# Patient Record
Sex: MALE | Race: OTHER | Employment: OTHER | URBAN - METROPOLITAN AREA
[De-identification: names, ages, dates, MRNs, and addresses within clinical notes are randomized per-mention and may not be internally consistent; named-entity substitution may affect disease eponyms.]

---

## 2019-03-03 ENCOUNTER — APPOINTMENT (OUTPATIENT)
Dept: GENERAL RADIOLOGY | Facility: HOSPITAL | Age: 79
DRG: 280 | End: 2019-03-03
Payer: MEDICARE

## 2019-03-03 ENCOUNTER — HOSPITAL ENCOUNTER (INPATIENT)
Facility: HOSPITAL | Age: 79
LOS: 6 days | Discharge: HOME | DRG: 280 | End: 2019-03-09
Admitting: HOSPITALIST
Payer: MEDICARE

## 2019-03-03 DIAGNOSIS — I21.4 NON-STEMI (NON-ST ELEVATED MYOCARDIAL INFARCTION) (HCC): Primary | ICD-10-CM

## 2019-03-03 DIAGNOSIS — I50.9 ACUTE ON CHRONIC CONGESTIVE HEART FAILURE, UNSPECIFIED HEART FAILURE TYPE (HCC): ICD-10-CM

## 2019-03-03 PROBLEM — R73.9 HYPERGLYCEMIA: Status: ACTIVE | Noted: 2019-03-03

## 2019-03-03 PROBLEM — R79.89 AZOTEMIA: Status: ACTIVE | Noted: 2019-03-03

## 2019-03-03 LAB
ADENOVIRUS PCR:: NEGATIVE
ANION GAP SERPL CALC-SCNC: 9 MMOL/L (ref 0–18)
APTT PPP: 26.9 SECONDS (ref 23.2–35.3)
APTT PPP: 64.8 SECONDS (ref 23.2–35.3)
B PERT DNA SPEC QL NAA+PROBE: NEGATIVE
BACTERIA UR QL AUTO: NEGATIVE /HPF
BASOPHILS # BLD AUTO: 0.04 X10(3) UL (ref 0–0.2)
BASOPHILS NFR BLD AUTO: 0.5 %
BILIRUB UR QL: NEGATIVE
BUN BLD-MCNC: 41 MG/DL (ref 7–18)
BUN/CREAT SERPL: 21.2 (ref 10–20)
C PNEUM DNA SPEC QL NAA+PROBE: NEGATIVE
CALCIUM BLD-MCNC: 9.1 MG/DL (ref 8.5–10.1)
CHLORIDE SERPL-SCNC: 109 MMOL/L (ref 98–107)
CHOLEST SMN-MCNC: 117 MG/DL (ref ?–200)
CLARITY UR: CLEAR
CO2 SERPL-SCNC: 22 MMOL/L (ref 21–32)
COLOR UR: YELLOW
CORONAVIRUS 229E PCR:: NEGATIVE
CORONAVIRUS HKU1 PCR:: NEGATIVE
CORONAVIRUS NL63 PCR:: NEGATIVE
CORONAVIRUS OC43 PCR:: NEGATIVE
CREAT BLD-MCNC: 1.93 MG/DL (ref 0.7–1.3)
DEPRECATED RDW RBC AUTO: 46.8 FL (ref 35.1–46.3)
EOSINOPHIL # BLD AUTO: 0.23 X10(3) UL (ref 0–0.7)
EOSINOPHIL NFR BLD AUTO: 2.7 %
ERYTHROCYTE [DISTWIDTH] IN BLOOD BY AUTOMATED COUNT: 13.8 % (ref 11–15)
EST. AVERAGE GLUCOSE BLD GHB EST-MCNC: 226 MG/DL (ref 68–126)
FLUAV + FLUBV RNA SPEC NAA+PROBE: NEGATIVE
FLUAV RNA SPEC QL NAA+PROBE: NEGATIVE
FLUBV RNA SPEC QL NAA+PROBE: NEGATIVE
GLUCOSE BLD-MCNC: 128 MG/DL (ref 70–99)
GLUCOSE BLDC GLUCOMTR-MCNC: 121 MG/DL (ref 70–99)
GLUCOSE BLDC GLUCOMTR-MCNC: 314 MG/DL (ref 70–99)
GLUCOSE UR-MCNC: NEGATIVE MG/DL
HBA1C MFR BLD HPLC: 9.5 % (ref ?–5.7)
HCT VFR BLD AUTO: 39.2 % (ref 39–53)
HDLC SERPL-MCNC: 40 MG/DL (ref 40–59)
HGB BLD-MCNC: 12.2 G/DL (ref 13–17.5)
HGB UR QL STRIP.AUTO: NEGATIVE
IMM GRANULOCYTES # BLD AUTO: 0.04 X10(3) UL (ref 0–1)
IMM GRANULOCYTES NFR BLD: 0.5 %
INR BLD: 1.18 (ref 0.9–1.2)
LDLC SERPL CALC-MCNC: 57 MG/DL (ref ?–100)
LYMPHOCYTES # BLD AUTO: 1.93 X10(3) UL (ref 1–4)
LYMPHOCYTES NFR BLD AUTO: 22.4 %
MCH RBC QN AUTO: 29.1 PG (ref 26–34)
MCHC RBC AUTO-ENTMCNC: 31.1 G/DL (ref 31–37)
MCV RBC AUTO: 93.6 FL (ref 80–100)
METAPNEUMOVIRUS PCR:: NEGATIVE
MONOCYTES # BLD AUTO: 1.04 X10(3) UL (ref 0.1–1)
MONOCYTES NFR BLD AUTO: 12.1 %
MRSA DNA SPEC QL NAA+PROBE: NEGATIVE
MYCOPLASMA PNEUMONIA PCR:: NEGATIVE
NEUTROPHILS # BLD AUTO: 5.34 X10 (3) UL (ref 1.5–7.7)
NEUTROPHILS # BLD AUTO: 5.34 X10(3) UL (ref 1.5–7.7)
NEUTROPHILS NFR BLD AUTO: 61.8 %
NITRITE UR QL STRIP.AUTO: NEGATIVE
NONHDLC SERPL-MCNC: 77 MG/DL (ref ?–130)
NT-PROBNP SERPL-MCNC: 8640 PG/ML (ref ?–450)
OSMOLALITY SERPL CALC.SUM OF ELEC: 302 MOSM/KG (ref 275–295)
PARAINFLUENZA 1 PCR:: NEGATIVE
PARAINFLUENZA 2 PCR:: NEGATIVE
PARAINFLUENZA 3 PCR:: NEGATIVE
PARAINFLUENZA 4 PCR:: NEGATIVE
PH UR: 5 [PH] (ref 5–8)
PLATELET # BLD AUTO: 220 10(3)UL (ref 150–450)
POTASSIUM SERPL-SCNC: 3.6 MMOL/L (ref 3.5–5.1)
PROT UR-MCNC: NEGATIVE MG/DL
PROTHROMBIN TIME: 14.9 SECONDS (ref 11.8–14.5)
RBC # BLD AUTO: 4.19 X10(6)UL (ref 3.8–5.8)
RBC #/AREA URNS AUTO: <1 /HPF
RHINOVIRUS/ENTERO PCR:: POSITIVE
RSV RNA SPEC QL NAA+PROBE: NEGATIVE
SODIUM SERPL-SCNC: 140 MMOL/L (ref 136–145)
SP GR UR STRIP: 1.01 (ref 1–1.03)
TRIGL SERPL-MCNC: 102 MG/DL (ref 30–149)
TROPONIN I SERPL-MCNC: 1.71 NG/ML (ref ?–0.04)
TROPONIN I SERPL-MCNC: 2.67 NG/ML (ref ?–0.04)
TROPONIN I SERPL-MCNC: 2.99 NG/ML (ref ?–0.04)
UROBILINOGEN UR STRIP-ACNC: <2
VIT C UR-MCNC: NEGATIVE MG/DL
VLDLC SERPL CALC-MCNC: 20 MG/DL (ref 0–30)
WBC # BLD AUTO: 8.6 X10(3) UL (ref 4–11)
WBC #/AREA URNS AUTO: 9 /HPF

## 2019-03-03 PROCEDURE — 71046 X-RAY EXAM CHEST 2 VIEWS: CPT

## 2019-03-03 PROCEDURE — 99223 1ST HOSP IP/OBS HIGH 75: CPT | Performed by: HOSPITALIST

## 2019-03-03 RX ORDER — ISOSORBIDE DINITRATE 40 MG/1
40 TABLET ORAL 3 TIMES DAILY
COMMUNITY

## 2019-03-03 RX ORDER — HEPARIN SODIUM AND DEXTROSE 10000; 5 [USP'U]/100ML; G/100ML
1000 INJECTION INTRAVENOUS ONCE
Status: COMPLETED | OUTPATIENT
Start: 2019-03-03 | End: 2019-03-03

## 2019-03-03 RX ORDER — POTASSIUM CHLORIDE 20 MEQ/1
40 TABLET, EXTENDED RELEASE ORAL EVERY 4 HOURS
Status: COMPLETED | OUTPATIENT
Start: 2019-03-03 | End: 2019-03-03

## 2019-03-03 RX ORDER — BENZONATATE 100 MG/1
100 CAPSULE ORAL 3 TIMES DAILY PRN
Status: DISCONTINUED | OUTPATIENT
Start: 2019-03-03 | End: 2019-03-09

## 2019-03-03 RX ORDER — POLYETHYLENE GLYCOL 3350 17 G/17G
17 POWDER, FOR SOLUTION ORAL DAILY PRN
Status: DISCONTINUED | OUTPATIENT
Start: 2019-03-03 | End: 2019-03-09

## 2019-03-03 RX ORDER — ATORVASTATIN CALCIUM 20 MG/1
20 TABLET, FILM COATED ORAL NIGHTLY
COMMUNITY

## 2019-03-03 RX ORDER — HYDROCODONE BITARTRATE AND ACETAMINOPHEN 5; 325 MG/1; MG/1
1 TABLET ORAL EVERY 4 HOURS PRN
Status: DISCONTINUED | OUTPATIENT
Start: 2019-03-03 | End: 2019-03-09

## 2019-03-03 RX ORDER — IPRATROPIUM BROMIDE AND ALBUTEROL SULFATE 2.5; .5 MG/3ML; MG/3ML
3 SOLUTION RESPIRATORY (INHALATION)
Status: DISCONTINUED | OUTPATIENT
Start: 2019-03-03 | End: 2019-03-07

## 2019-03-03 RX ORDER — FUROSEMIDE 10 MG/ML
40 INJECTION INTRAMUSCULAR; INTRAVENOUS ONCE
Status: COMPLETED | OUTPATIENT
Start: 2019-03-03 | End: 2019-03-03

## 2019-03-03 RX ORDER — HEPARIN SODIUM 1000 [USP'U]/ML
5000 INJECTION, SOLUTION INTRAVENOUS; SUBCUTANEOUS ONCE
Status: COMPLETED | OUTPATIENT
Start: 2019-03-03 | End: 2019-03-03

## 2019-03-03 RX ORDER — DEXTROSE MONOHYDRATE 25 G/50ML
50 INJECTION, SOLUTION INTRAVENOUS AS NEEDED
Status: DISCONTINUED | OUTPATIENT
Start: 2019-03-03 | End: 2019-03-03

## 2019-03-03 RX ORDER — BISACODYL 10 MG
10 SUPPOSITORY, RECTAL RECTAL
Status: DISCONTINUED | OUTPATIENT
Start: 2019-03-03 | End: 2019-03-09

## 2019-03-03 RX ORDER — 0.9 % SODIUM CHLORIDE 0.9 %
VIAL (ML) INJECTION
Status: COMPLETED
Start: 2019-03-03 | End: 2019-03-03

## 2019-03-03 RX ORDER — FUROSEMIDE 10 MG/ML
40 INJECTION INTRAMUSCULAR; INTRAVENOUS 2 TIMES DAILY
Status: DISCONTINUED | OUTPATIENT
Start: 2019-03-03 | End: 2019-03-03

## 2019-03-03 RX ORDER — FUROSEMIDE 40 MG/1
40 TABLET ORAL 2 TIMES DAILY
Status: ON HOLD | COMMUNITY
End: 2019-03-09

## 2019-03-03 RX ORDER — DOCUSATE SODIUM 100 MG/1
100 CAPSULE, LIQUID FILLED ORAL 2 TIMES DAILY
Status: DISCONTINUED | OUTPATIENT
Start: 2019-03-03 | End: 2019-03-09

## 2019-03-03 RX ORDER — ASPIRIN 325 MG
325 TABLET, DELAYED RELEASE (ENTERIC COATED) ORAL DAILY
Status: DISCONTINUED | OUTPATIENT
Start: 2019-03-03 | End: 2019-03-06

## 2019-03-03 RX ORDER — MORPHINE SULFATE 2 MG/ML
2 INJECTION, SOLUTION INTRAMUSCULAR; INTRAVENOUS
Status: DISCONTINUED | OUTPATIENT
Start: 2019-03-03 | End: 2019-03-09

## 2019-03-03 RX ORDER — ACETAMINOPHEN 325 MG/1
650 TABLET ORAL EVERY 4 HOURS PRN
Status: DISCONTINUED | OUTPATIENT
Start: 2019-03-03 | End: 2019-03-09

## 2019-03-03 RX ORDER — DEXTROSE MONOHYDRATE 25 G/50ML
50 INJECTION, SOLUTION INTRAVENOUS AS NEEDED
Status: DISCONTINUED | OUTPATIENT
Start: 2019-03-03 | End: 2019-03-07

## 2019-03-03 RX ORDER — FOLIC ACID 1 MG/1
5 TABLET ORAL DAILY
COMMUNITY

## 2019-03-03 RX ORDER — CLOPIDOGREL BISULFATE 75 MG/1
600 TABLET ORAL ONCE
Status: COMPLETED | OUTPATIENT
Start: 2019-03-03 | End: 2019-03-03

## 2019-03-03 RX ORDER — FUROSEMIDE 10 MG/ML
20 INJECTION INTRAMUSCULAR; INTRAVENOUS
Status: DISCONTINUED | OUTPATIENT
Start: 2019-03-03 | End: 2019-03-04

## 2019-03-03 RX ORDER — CODEINE PHOSPHATE AND GUAIFENESIN 10; 100 MG/5ML; MG/5ML
5 SOLUTION ORAL EVERY 4 HOURS PRN
Status: DISCONTINUED | OUTPATIENT
Start: 2019-03-03 | End: 2019-03-09

## 2019-03-03 RX ORDER — CLOPIDOGREL BISULFATE 75 MG/1
75 TABLET ORAL DAILY
COMMUNITY

## 2019-03-03 RX ORDER — HEPARIN SODIUM AND DEXTROSE 10000; 5 [USP'U]/100ML; G/100ML
INJECTION INTRAVENOUS CONTINUOUS
Status: DISCONTINUED | OUTPATIENT
Start: 2019-03-03 | End: 2019-03-07

## 2019-03-03 RX ORDER — HEPARIN SODIUM 5000 [USP'U]/ML
5000 INJECTION, SOLUTION INTRAVENOUS; SUBCUTANEOUS EVERY 8 HOURS SCHEDULED
Status: DISCONTINUED | OUTPATIENT
Start: 2019-03-03 | End: 2019-03-03

## 2019-03-03 RX ORDER — NITROGLYCERIN 0.4 MG/1
0.4 TABLET SUBLINGUAL EVERY 5 MIN PRN
Status: DISCONTINUED | OUTPATIENT
Start: 2019-03-03 | End: 2019-03-09

## 2019-03-03 RX ORDER — B-COMPLEX WITH VITAMIN C
TABLET ORAL DAILY
COMMUNITY

## 2019-03-03 RX ORDER — HYDROCODONE BITARTRATE AND ACETAMINOPHEN 5; 325 MG/1; MG/1
2 TABLET ORAL EVERY 4 HOURS PRN
Status: DISCONTINUED | OUTPATIENT
Start: 2019-03-03 | End: 2019-03-09

## 2019-03-03 RX ORDER — FLUTICASONE PROPIONATE 50 MCG
1 SPRAY, SUSPENSION (ML) NASAL DAILY
Status: DISCONTINUED | OUTPATIENT
Start: 2019-03-03 | End: 2019-03-09

## 2019-03-03 RX ORDER — BISOPROLOL FUMARATE 5 MG/1
5 TABLET ORAL DAILY
COMMUNITY

## 2019-03-03 NOTE — H&P
[facility]    History & Physical    [facility] Patient Status:  Emergency    1940 MRN [id number]   Location [address] Attending Samina Carreno MD   Hosp Day # 0 PCP No primary care provider on file Negative. Psychiatric:  Negative. ROS reviewed as documented in chart    Physical Exam:  Temp:  [98.2 °F (36.8 °C)] 98.2 °F (36.8 °C)  Pulse:  [48-52] 51  Resp:  [18-20] 20  BP: (151-162)/() 152/65    General:  Alert and oriented.   Diffuse skin pro Lasix 20mg IV q12hrs. - Strict I/O, daily weights  - ASA 325mg daily   - nitro paste  - Cardiology consult,.   - 2D ECHO    LANI vs CKD  - Prob at baseline BUN/Creat  - rpt renal panel in AM.   - Monitor while getting diuretics.    - Check UA   - Monitor U

## 2019-03-03 NOTE — CONSULTS
[facility]    Cardiology Consultation    [place] Patient Status:  Emergency    1940 MRN [id number]   Location [address] Attending Jovany Machuca MD   Hosp Day # 0 PCP No primary care provider on exertion  CARDIOVASCULAR: See HPI  GI: denies abdominal pain and denies heartburn  NEURO: denies headaches  All other systems reviewed and negative.     BP (!) 162/127   Pulse (!) 48   Temp 98.2 °F (36.8 °C) (Oral)   Resp 18   Ht 5' 6\" (1.676 m)   Wt 200 l Impression:  Patient Active Problem List:     Azotemia     Hyperglycemia     Non-STEMI (non-ST elevated myocardial infarction) ([facility] [address]          Recommendations:  Problem #1 shortness of breath  Presentations consistent with congestive heart failure lik

## 2019-03-03 NOTE — ED PROVIDER NOTES
Patient Seen in: [facility] 2w/sw    History   Patient presents with:  Cough/URI    Stated Complaint: cough    HPI    Patient presents the emergency department complaining of cough, shortness of breath difficulty with lying flat.   Family states th normal. No respiratory distress. Abdominal: Soft. He exhibits no distension. There is no tenderness. Musculoskeletal: Normal range of motion. He exhibits no tenderness. Neurological: He is alert and oriented to person, place, and time.    Skin: Skin i other components within normal limits   PTT, ACTIVATED - Normal   LIPID PANEL - Normal   INFLUENZA A/B AND RSV PCR - Normal   CBC WITH DIFFERENTIAL WITH PLATELET    Narrative:      The following orders were created for panel order CBC WITH DIFFERENTIAL WITH documentation  10 minutes for physical exam, re-examination and discussing results with patient and family  10 minutes discussing case with admitting physicians and consultants  5 minutes interpreting labs and diagnostic testing    He was evaluated by card

## 2019-03-03 NOTE — ED NOTES
Patient arrives with family for complaints of \"feeling sick\" and SOB for multiple weeks, with worsening in symptoms the last three days. Patient complains of SOB and cough, denies fever or n/v/d.  [place] with primary language Mongolian

## 2019-03-03 NOTE — ED NOTES
Report given to [employer]. Will continue to monitor until transfer to unit. Call light at reach, family remains at bedside.

## 2019-03-03 NOTE — PLAN OF CARE
Problem: Patient Centered Care  Goal: Patient preferences are identified and integrated in the patient's plan of care  Interventions:  - What would you like us to know as we care for you? I am visiting from [facility] and am Irish speaking only.    - Provide t for life threatening arrhythmias  - Monitor electrolytes and administer replacement therapy as ordered  Outcome: Progressing      Problem: RESPIRATORY - ADULT  Goal: Achieves optimal ventilation and oxygenation  INTERVENTIONS:  - Assess for changes in resp Monitor response to electrolyte replacements, including rhythm and repeat lab results as appropriate  - Fluid restriction as ordered  - Instruct patient on fluid and nutrition restrictions as appropriate  Outcome: Progressing      Problem: HEMATOLOGIC - AD

## 2019-03-03 NOTE — ED NOTES
Nephdaniel states that patient travels between [place] and [facility], where he receives most of his care in [facility]. MD updated.

## 2019-03-04 ENCOUNTER — APPOINTMENT (OUTPATIENT)
Dept: GENERAL RADIOLOGY | Facility: HOSPITAL | Age: 79
DRG: 280 | End: 2019-03-04
Attending: HOSPITALIST
Payer: MEDICARE

## 2019-03-04 ENCOUNTER — APPOINTMENT (OUTPATIENT)
Dept: ULTRASOUND IMAGING | Facility: HOSPITAL | Age: 79
DRG: 280 | End: 2019-03-04
Attending: INTERNAL MEDICINE
Payer: MEDICARE

## 2019-03-04 ENCOUNTER — APPOINTMENT (OUTPATIENT)
Dept: CV DIAGNOSTICS | Facility: HOSPITAL | Age: 79
DRG: 280 | End: 2019-03-04
Attending: HOSPITALIST
Payer: MEDICARE

## 2019-03-04 LAB
ANION GAP SERPL CALC-SCNC: 10 MMOL/L (ref 0–18)
APTT PPP: 63.1 SECONDS (ref 23.2–35.3)
APTT PPP: 70.1 SECONDS (ref 23.2–35.3)
BILIRUB UR QL: NEGATIVE
BUN BLD-MCNC: 44 MG/DL (ref 7–18)
BUN/CREAT SERPL: 20.8 (ref 10–20)
CALCIUM BLD-MCNC: 9 MG/DL (ref 8.5–10.1)
CHLORIDE SERPL-SCNC: 107 MMOL/L (ref 98–107)
CHLORIDE UR-SCNC: 91 MMOL/L
CHOLEST SMN-MCNC: 114 MG/DL (ref ?–200)
CLARITY UR: CLEAR
CO2 SERPL-SCNC: 22 MMOL/L (ref 21–32)
COLOR UR: YELLOW
CREAT BLD-MCNC: 2.12 MG/DL (ref 0.7–1.3)
DEPRECATED RDW RBC AUTO: 46.3 FL (ref 35.1–46.3)
ERYTHROCYTE [DISTWIDTH] IN BLOOD BY AUTOMATED COUNT: 13.7 % (ref 11–15)
GLUCOSE BLD-MCNC: 292 MG/DL (ref 70–99)
GLUCOSE BLDC GLUCOMTR-MCNC: 208 MG/DL (ref 70–99)
GLUCOSE BLDC GLUCOMTR-MCNC: 239 MG/DL (ref 70–99)
GLUCOSE BLDC GLUCOMTR-MCNC: 247 MG/DL (ref 70–99)
GLUCOSE BLDC GLUCOMTR-MCNC: 267 MG/DL (ref 70–99)
GLUCOSE BLDC GLUCOMTR-MCNC: 296 MG/DL (ref 70–99)
GLUCOSE BLDC GLUCOMTR-MCNC: 319 MG/DL (ref 70–99)
GLUCOSE UR-MCNC: NEGATIVE MG/DL
HAV IGM SER QL: 1.8 MG/DL (ref 1.6–2.6)
HCT VFR BLD AUTO: 37.3 % (ref 39–53)
HDLC SERPL-MCNC: 35 MG/DL (ref 40–59)
HGB BLD-MCNC: 11.7 G/DL (ref 13–17.5)
HGB UR QL STRIP.AUTO: NEGATIVE
INR BLD: 1.28 (ref 0.9–1.2)
KETONES UR-MCNC: NEGATIVE MG/DL
LDLC SERPL CALC-MCNC: 54 MG/DL (ref ?–100)
LEUKOCYTE ESTERASE UR QL STRIP.AUTO: NEGATIVE
MCH RBC QN AUTO: 29.3 PG (ref 26–34)
MCHC RBC AUTO-ENTMCNC: 31.4 G/DL (ref 31–37)
MCV RBC AUTO: 93.5 FL (ref 80–100)
NITRITE UR QL STRIP.AUTO: NEGATIVE
NONHDLC SERPL-MCNC: 79 MG/DL (ref ?–130)
OSMOLALITY SERPL CALC.SUM OF ELEC: 310 MOSM/KG (ref 275–295)
PH UR: 5 [PH] (ref 5–8)
PLATELET # BLD AUTO: 229 10(3)UL (ref 150–450)
POTASSIUM SERPL-SCNC: 4.2 MMOL/L (ref 3.5–5.1)
POTASSIUM UR-SCNC: 33.5 MMOL/L
PROCALCITONIN SERPL-MCNC: <0.05 NG/ML (ref ?–0.11)
PROT UR-MCNC: NEGATIVE MG/DL
PROTHROMBIN TIME: 15.9 SECONDS (ref 11.8–14.5)
RBC # BLD AUTO: 3.99 X10(6)UL (ref 3.8–5.8)
SODIUM SERPL-SCNC: 139 MMOL/L (ref 136–145)
SODIUM SERPL-SCNC: 72 MMOL/L
SP GR UR STRIP: 1.01 (ref 1–1.03)
T4 FREE SERPL-MCNC: 1 NG/DL (ref 0.8–1.7)
TRIGL SERPL-MCNC: 127 MG/DL (ref 30–149)
TSI SER-ACNC: 6.51 MIU/ML (ref 0.36–3.74)
UROBILINOGEN UR STRIP-ACNC: <2
VIT C UR-MCNC: NEGATIVE MG/DL
VLDLC SERPL CALC-MCNC: 25 MG/DL (ref 0–30)
WBC # BLD AUTO: 8.2 X10(3) UL (ref 4–11)

## 2019-03-04 PROCEDURE — 99223 1ST HOSP IP/OBS HIGH 75: CPT | Performed by: INTERNAL MEDICINE

## 2019-03-04 PROCEDURE — 99233 SBSQ HOSP IP/OBS HIGH 50: CPT | Performed by: HOSPITALIST

## 2019-03-04 PROCEDURE — 76775 US EXAM ABDO BACK WALL LIM: CPT | Performed by: INTERNAL MEDICINE

## 2019-03-04 PROCEDURE — 93306 TTE W/DOPPLER COMPLETE: CPT | Performed by: HOSPITALIST

## 2019-03-04 PROCEDURE — 71045 X-RAY EXAM CHEST 1 VIEW: CPT | Performed by: HOSPITALIST

## 2019-03-04 RX ORDER — CHLORHEXIDINE GLUCONATE 4 G/100ML
30 SOLUTION TOPICAL
Status: ACTIVE | OUTPATIENT
Start: 2019-03-05 | End: 2019-03-05

## 2019-03-04 RX ORDER — ATORVASTATIN CALCIUM 20 MG/1
20 TABLET, FILM COATED ORAL NIGHTLY
Status: DISCONTINUED | OUTPATIENT
Start: 2019-03-04 | End: 2019-03-09

## 2019-03-04 RX ORDER — SODIUM CHLORIDE 9 MG/ML
INJECTION, SOLUTION INTRAVENOUS CONTINUOUS
Status: DISCONTINUED | OUTPATIENT
Start: 2019-03-04 | End: 2019-03-07

## 2019-03-04 RX ORDER — CLOPIDOGREL BISULFATE 75 MG/1
75 TABLET ORAL DAILY
Status: DISCONTINUED | OUTPATIENT
Start: 2019-03-04 | End: 2019-03-06

## 2019-03-04 RX ORDER — ISOSORBIDE DINITRATE 20 MG/1
40 TABLET ORAL 3 TIMES DAILY
Status: DISCONTINUED | OUTPATIENT
Start: 2019-03-04 | End: 2019-03-09

## 2019-03-04 RX ORDER — LEVOFLOXACIN 750 MG/1
750 TABLET ORAL
Status: DISCONTINUED | OUTPATIENT
Start: 2019-03-04 | End: 2019-03-09

## 2019-03-04 RX ORDER — ASPIRIN 81 MG/1
324 TABLET, CHEWABLE ORAL ONCE
Status: DISCONTINUED | OUTPATIENT
Start: 2019-03-04 | End: 2019-03-09

## 2019-03-04 RX ORDER — METOPROLOL TARTRATE 50 MG/1
50 TABLET, FILM COATED ORAL
Status: DISCONTINUED | OUTPATIENT
Start: 2019-03-04 | End: 2019-03-09

## 2019-03-04 RX ORDER — MAGNESIUM OXIDE 400 MG (241.3 MG MAGNESIUM) TABLET
400 TABLET ONCE
Status: COMPLETED | OUTPATIENT
Start: 2019-03-04 | End: 2019-03-04

## 2019-03-04 RX ORDER — DEXTROSE MONOHYDRATE 25 G/50ML
50 INJECTION, SOLUTION INTRAVENOUS AS NEEDED
Status: DISCONTINUED | OUTPATIENT
Start: 2019-03-04 | End: 2019-03-09

## 2019-03-04 NOTE — DIABETES ED
Patient educated regarding diabetes diet basics. Discussed carbohydrate foods, portion sizes, and food label reading. Handout given and initial meal plan provided. Encouraged to attend outpatient diabetes education. Questions answered.      Family helped t

## 2019-03-04 NOTE — PHYSICAL THERAPY NOTE
Orders received and chart reviewed. Per RN Lowanda Klinefelter, patient is having an EKG and pending whether to go to Cath lab. Will re-attempt on 3/5 per RN recommendation.

## 2019-03-04 NOTE — CM/SW NOTE
Received MDO for social support. Briefly met with patient and wife, they requested SW contact \"cousin\" Valentino. Spoke with Reena Saldaña ([phone]). He states that typically patient lives in [place] with wife & their son Rafael Cerna.  Patient travels between [facility]

## 2019-03-04 NOTE — PROGRESS NOTES
[place] [facility]    Progress Note    Adrienne Whipple Patient Status:  Inpatient    1940 MRN [id number]   Location [facility] 2W/SW Attending Rosalio Hays MD   [facility] Day # 1 PCP No primary care provider on file.        Subjective: range of motion in all the extremities. EXTREMITIES: There was no edema, clubbing or cyanosis  NEUROLOGICAL:  There was no focal deficit. Cranial nerves intact.          Current Scheduled Inpatient Meds:     • [START ON 3/5/2019] Chlorhexidine Gluconate 3/3/2019  CONCLUSION:  1. Mild cardiomegaly. Tortuous thoracic aorta 2. Mild pulmonary interstitial congestive changes with bilateral effusions.     Dictated by (CST): Jose Nathan MD on 3/03/2019 at 13:24     Approved by (CST): Jose Nathan, consider cath urgently or nitro gtt. - Monitor in PCU.      Acute exacerbation CHF, NOS   - Has h/o CHF per family. They did not bring his medications have requested this.    - hold lasix in light of possible cath Wed.   - Strict I/O, daily weights  - ASA

## 2019-03-04 NOTE — PROGRESS NOTES
[facility]    Cardiology Progress Note    Dotty Camacho Patient Status:  Inpatient    1940 MRN [id number]   Location [facility] 2W/SW Attending Darnell Milton MD   [facility] Day # 1 PCP No primary care provider on file.      3/3/2 03/03/19   1322  03/03/19   1514  03/03/19   1806   TROP  2.990*  2.670*  1.710*       Allergies:   No Known Allergies    Medications:    Current Facility-Administered Medications:  [START ON 3/5/2019] Chlorhexidine Gluconate (HIBICLENS) 4 % liquid 30 mL 3 Propionate (FLONASE) 50 MCG/ACT nasal spray 1 spray 1 spray Each Nare Daily   influenza virus vaccine (FLUAD) ages 72 years and older inj 0.5ml 0.5 mL Intramuscular Prior to discharge   morphINE sulfate (PF) 2 MG/ML injection 2 mg 2 mg Intravenous Q3H PRN

## 2019-03-04 NOTE — OCCUPATIONAL THERAPY NOTE
Orders received and chart reviewed - per KAILEY Walden pt is having EKG and possible need for Cath Lab - will continue to follow and re-attempt 3/5 when appropriate

## 2019-03-04 NOTE — CARDIAC REHAB
Cardiac Rehab Phase I    Activity:  Distance   Assistance needed   Patient tolerated activity . Education:  Handouts provided and reviewed: [address] Diet: Healthy Cardiac diet reviewed.     Disease Process: Disease process rev

## 2019-03-05 ENCOUNTER — APPOINTMENT (OUTPATIENT)
Dept: GENERAL RADIOLOGY | Facility: HOSPITAL | Age: 79
DRG: 280 | End: 2019-03-05
Attending: INTERNAL MEDICINE
Payer: MEDICARE

## 2019-03-05 LAB
ALBUMIN SERPL-MCNC: 2.8 G/DL (ref 3.4–5)
ALBUMIN/GLOB SERPL: 0.7 {RATIO} (ref 1–2)
ALP LIVER SERPL-CCNC: 80 U/L (ref 45–117)
ALT SERPL-CCNC: 16 U/L (ref 16–61)
ANION GAP SERPL CALC-SCNC: 11 MMOL/L (ref 0–18)
APTT PPP: 60.2 SECONDS (ref 23.2–35.3)
APTT PPP: 79.2 SECONDS (ref 23.2–35.3)
AST SERPL-CCNC: 52 U/L (ref 15–37)
BASOPHILS # BLD AUTO: 0.04 X10(3) UL (ref 0–0.2)
BASOPHILS NFR BLD AUTO: 0.4 %
BILIRUB SERPL-MCNC: 0.5 MG/DL (ref 0.1–2)
BILIRUB UR QL: NEGATIVE
BUN BLD-MCNC: 41 MG/DL (ref 7–18)
BUN/CREAT SERPL: 21.1 (ref 10–20)
CALCIUM BLD-MCNC: 8.9 MG/DL (ref 8.5–10.1)
CHLORIDE SERPL-SCNC: 107 MMOL/L (ref 98–107)
CLARITY UR: CLEAR
CO2 SERPL-SCNC: 22 MMOL/L (ref 21–32)
CREAT BLD-MCNC: 1.94 MG/DL (ref 0.7–1.3)
CREAT UR-SCNC: 124 MG/DL
CREAT UR-SCNC: 124 MG/DL
DEPRECATED RDW RBC AUTO: 47.6 FL (ref 35.1–46.3)
EOSINOPHIL # BLD AUTO: 0.1 X10(3) UL (ref 0–0.7)
EOSINOPHIL NFR BLD AUTO: 0.9 %
ERYTHROCYTE [DISTWIDTH] IN BLOOD BY AUTOMATED COUNT: 13.8 % (ref 11–15)
GLOBULIN PLAS-MCNC: 4.1 G/DL (ref 2.8–4.4)
GLUCOSE BLD-MCNC: 237 MG/DL (ref 70–99)
GLUCOSE BLDC GLUCOMTR-MCNC: 113 MG/DL (ref 70–99)
GLUCOSE BLDC GLUCOMTR-MCNC: 227 MG/DL (ref 70–99)
GLUCOSE BLDC GLUCOMTR-MCNC: 277 MG/DL (ref 70–99)
GLUCOSE UR-MCNC: 50 MG/DL
HAV IGM SER QL: 1.9 MG/DL (ref 1.6–2.6)
HCT VFR BLD AUTO: 36.3 % (ref 39–53)
HGB BLD-MCNC: 11.3 G/DL (ref 13–17.5)
IMM GRANULOCYTES # BLD AUTO: 0.07 X10(3) UL (ref 0–1)
IMM GRANULOCYTES NFR BLD: 0.6 %
KETONES UR-MCNC: NEGATIVE MG/DL
LYMPHOCYTES # BLD AUTO: 1.21 X10(3) UL (ref 1–4)
LYMPHOCYTES NFR BLD AUTO: 11.2 %
M PROTEIN MFR SERPL ELPH: 6.9 G/DL (ref 6.4–8.2)
MCH RBC QN AUTO: 29.2 PG (ref 26–34)
MCHC RBC AUTO-ENTMCNC: 31.1 G/DL (ref 31–37)
MCV RBC AUTO: 93.8 FL (ref 80–100)
MICROALBUMIN UR-MCNC: 76.6 MG/DL
MICROALBUMIN/CREAT 24H UR-RTO: 617.7 UG/MG (ref ?–30)
MONOCYTES # BLD AUTO: 1.35 X10(3) UL (ref 0.1–1)
MONOCYTES NFR BLD AUTO: 12.5 %
NEUTROPHILS # BLD AUTO: 8.05 X10 (3) UL (ref 1.5–7.7)
NEUTROPHILS # BLD AUTO: 8.05 X10(3) UL (ref 1.5–7.7)
NEUTROPHILS NFR BLD AUTO: 74.4 %
NITRITE UR QL STRIP.AUTO: NEGATIVE
OSMOLALITY SERPL CALC.SUM OF ELEC: 308 MOSM/KG (ref 275–295)
PH UR: 5 [PH] (ref 5–8)
PHOSPHATE SERPL-MCNC: 3 MG/DL (ref 2.5–4.9)
PLATELET # BLD AUTO: 222 10(3)UL (ref 150–450)
POTASSIUM SERPL-SCNC: 4.3 MMOL/L (ref 3.5–5.1)
PROT UR-MCNC: 100 MG/DL
RBC # BLD AUTO: 3.87 X10(6)UL (ref 3.8–5.8)
RBC #/AREA URNS AUTO: 5089 /HPF
SODIUM SERPL-SCNC: 140 MMOL/L (ref 136–145)
SP GR UR STRIP: 1.02 (ref 1–1.03)
UROBILINOGEN UR STRIP-ACNC: <2
VIT C UR-MCNC: NEGATIVE MG/DL
WBC # BLD AUTO: 10.8 X10(3) UL (ref 4–11)
WBC #/AREA URNS AUTO: 22 /HPF

## 2019-03-05 PROCEDURE — 99233 SBSQ HOSP IP/OBS HIGH 50: CPT | Performed by: INTERNAL MEDICINE

## 2019-03-05 PROCEDURE — 71045 X-RAY EXAM CHEST 1 VIEW: CPT | Performed by: INTERNAL MEDICINE

## 2019-03-05 PROCEDURE — 99233 SBSQ HOSP IP/OBS HIGH 50: CPT | Performed by: HOSPITALIST

## 2019-03-05 RX ORDER — CHLORHEXIDINE GLUCONATE 4 G/100ML
30 SOLUTION TOPICAL
Status: COMPLETED | OUTPATIENT
Start: 2019-03-06 | End: 2019-03-06

## 2019-03-05 NOTE — PROGRESS NOTES
[place] [facility] - [facility]    Progress Note    [facility] Patient Status:  Inpatient    1940 MRN [id number]   Location [facility] 2W/SW Attending Rod Otero MD   [facility] Day # 2 PCP No primary care provider on file.        Subjective: 3/6/2019] sodium chloride 0.9%  500 mL Intravenous Once   • insulin detemir  20 Units Subcutaneous Nightly   • Insulin Aspart Pen  8 Units Subcutaneous TID CC   • aspirin  324 mg Oral Once   • atorvastatin  20 mg Oral Nightly   • Metoprolol Tartrate  50 mg 03/03/19  1:23 PM   Result Value Ref Range    Urine Culture No Growth at 18-24 hrs. N/A       Imaging/EKG:   Xr Chest Ap Portable  (cpt=71045)    Result Date: 3/5/2019  CONCLUSION:  1.  Cardiomegaly with persistent mild pulmonary vascular congestion and pul family. They did not bring his medications have requested this.    - hold lasix in light of possible cath Wed.   - Strict I/O, daily weights  - ASA 325mg daily , Plavix 75mg daily   - Nitro, metoprolol  - Cardiology following.   - 2D ECHO     CKD   - Prob a

## 2019-03-05 NOTE — OCCUPATIONAL THERAPY NOTE
Per RN Musa Ruiz, pt not appropriate for therapy at this time. Possible cardiac cath on Wed. Treatment deferred today.  Will follow up and initiate therapy as appropriate

## 2019-03-05 NOTE — CONSULTS
[facility]    PATIENT'S NAME: Karly Zhang   ATTENDING PHYSICIAN: Odette Milton MD   CONSULTING PHYSICIAN: Yvrose Mccrary MD   PATIENT ACCOUNT#:   [id number]    LOCATION:  [address] [place] [address] #:   [id number]       DATE OF BIRTH: infusion, and NovoLog insulin. He is also on Levemir DuoNeb, and Isordil 40 mg t.i.d., along with Lopressor 50 mg b.i.d. and nitroglycerin ointment. ALLERGIES:  There are no known allergies. SOCIAL HISTORY:  Currently nonsmoker.   Again, just here secondary to diuretics. 2.   Coronary artery disease, now status post non-Q-wave myocardial infarction. The patient does have significant cardiomyopathy and mitral regurgitation. 3.   Viral syndrome, and respiratory panel was positive for rhinovirus.   4

## 2019-03-05 NOTE — PROGRESS NOTES
[place] [facility] - [facility]    Cardiology Progress Note    Moisés Slider Patient Status:  Inpatient    1940 MRN [id number]   Location [facility] 2W/SW Attending Carolina Augustine MD   [facility] Day # 2 PCP No primary care provider on file.      3/3/2 1.710*       Allergies:   No Known Allergies    Medications:    Current Facility-Administered Medications:  Chlorhexidine Gluconate (HIBICLENS) 4 % liquid 30 mL 30 mL Topical On Call   0.9%  NaCl infusion  Intravenous Continuous   aspirin chewable tab 324 spray 1 spray Each Nare Daily   influenza virus vaccine (FLUAD) ages 72 years and older inj 0.5ml 0.5 mL Intramuscular Prior to discharge   morphINE sulfate (PF) 2 MG/ML injection 2 mg 2 mg Intravenous Q3H PRN   dextrose 50 % injection 50 mL 50 mL Intraven

## 2019-03-05 NOTE — CM/SW NOTE
Care Management/BPCI:    Met with patient and spoke to Rossy amor Members by phone to explain the BPCI/Medicare program. Patient agreed with phone f/u for 3 months from [facility] after discharge from [facility]. Patient was enrolled under .  BPCI/M

## 2019-03-05 NOTE — PHYSICAL THERAPY NOTE
Orders received and chart reviewed. Discussed with KAILEY Najera. Patient going to cath lab on 3/6. Will re-attempt after cardiac cath.

## 2019-03-05 NOTE — PROGRESS NOTES
[facility]  Nephrology Daily Progress Note    Cyn Cason  [id number]  78year old      HPI:   Cyn Cason is a 78year old male. No family members currently present but as per RN quiet night w/out CP or SOB. Eating OK.        ROS:     Co for age    Labs:  Lab Results   Component Value Date    WBC 10.8 03/05/2019    HGB 11.3 03/05/2019    HCT 36.3 03/05/2019    .0 03/05/2019    CREATSERUM 1.94 03/05/2019    BUN 41 03/05/2019     03/05/2019    K 4.3 03/05/2019     03/05/20 Xr Chest Ap Portable  (cpt=71045)    Result Date: 3/4/2019  CONCLUSION:  1. Mild cardiomegaly and mild pulmonary congestion improved somewhat since previous study.   There is persistent bibasilar airspace disease which may suggest mild bibasilar pneumon 5-325 MG per tab 1 tablet, 1 tablet, Oral, Q4H PRN **OR** HYDROcodone-acetaminophen (NORCO) 5-325 MG per tab 2 tablet, 2 tablet, Oral, Q4H PRN  •  aspirin EC EC tab 325 mg, 325 mg, Oral, Daily  •  heparin (PORCINE) drip 97171whqex/250mL infusion CONTINUOUS preparation for cath.  Discussed with RN.              3/5/2019  Jamie Bauer MD

## 2019-03-06 ENCOUNTER — APPOINTMENT (OUTPATIENT)
Dept: INTERVENTIONAL RADIOLOGY/VASCULAR | Facility: HOSPITAL | Age: 79
DRG: 280 | End: 2019-03-06
Attending: NURSE PRACTITIONER
Payer: MEDICARE

## 2019-03-06 ENCOUNTER — APPOINTMENT (OUTPATIENT)
Dept: ULTRASOUND IMAGING | Facility: HOSPITAL | Age: 79
DRG: 280 | End: 2019-03-06
Attending: CLINICAL NURSE SPECIALIST
Payer: MEDICARE

## 2019-03-06 ENCOUNTER — APPOINTMENT (OUTPATIENT)
Dept: CT IMAGING | Facility: HOSPITAL | Age: 79
DRG: 280 | End: 2019-03-06
Attending: CLINICAL NURSE SPECIALIST
Payer: MEDICARE

## 2019-03-06 LAB
ALBUMIN SERPL-MCNC: 2.7 G/DL (ref 3.4–5)
ANION GAP SERPL CALC-SCNC: 6 MMOL/L (ref 0–18)
APTT PPP: 64.6 SECONDS (ref 23.2–35.3)
APTT PPP: 82.1 SECONDS (ref 23.2–35.3)
BASOPHILS # BLD AUTO: 0.03 X10(3) UL (ref 0–0.2)
BASOPHILS NFR BLD AUTO: 0.3 %
BUN BLD-MCNC: 39 MG/DL (ref 7–18)
BUN/CREAT SERPL: 20 (ref 10–20)
CALCIUM BLD-MCNC: 8.7 MG/DL (ref 8.5–10.1)
CHLORIDE SERPL-SCNC: 110 MMOL/L (ref 98–107)
CO2 SERPL-SCNC: 24 MMOL/L (ref 21–32)
CREAT BLD-MCNC: 1.95 MG/DL (ref 0.7–1.3)
DEPRECATED RDW RBC AUTO: 47.5 FL (ref 35.1–46.3)
EOSINOPHIL # BLD AUTO: 0.38 X10(3) UL (ref 0–0.7)
EOSINOPHIL NFR BLD AUTO: 3.7 %
ERYTHROCYTE [DISTWIDTH] IN BLOOD BY AUTOMATED COUNT: 14 % (ref 11–15)
GLUCOSE BLD-MCNC: 119 MG/DL (ref 70–99)
GLUCOSE BLDC GLUCOMTR-MCNC: 107 MG/DL (ref 70–99)
GLUCOSE BLDC GLUCOMTR-MCNC: 133 MG/DL (ref 70–99)
GLUCOSE BLDC GLUCOMTR-MCNC: 135 MG/DL (ref 70–99)
GLUCOSE BLDC GLUCOMTR-MCNC: 210 MG/DL (ref 70–99)
HAV IGM SER QL: 2 MG/DL (ref 1.6–2.6)
HCT VFR BLD AUTO: 32.7 % (ref 39–53)
HGB BLD-MCNC: 10.4 G/DL (ref 13–17.5)
IMM GRANULOCYTES # BLD AUTO: 0.04 X10(3) UL (ref 0–1)
IMM GRANULOCYTES NFR BLD: 0.4 %
LYMPHOCYTES # BLD AUTO: 2.26 X10(3) UL (ref 1–4)
LYMPHOCYTES NFR BLD AUTO: 22.1 %
MCH RBC QN AUTO: 30 PG (ref 26–34)
MCHC RBC AUTO-ENTMCNC: 31.8 G/DL (ref 31–37)
MCV RBC AUTO: 94.2 FL (ref 80–100)
MONOCYTES # BLD AUTO: 1.25 X10(3) UL (ref 0.1–1)
MONOCYTES NFR BLD AUTO: 12.2 %
NEUTROPHILS # BLD AUTO: 6.26 X10 (3) UL (ref 1.5–7.7)
NEUTROPHILS # BLD AUTO: 6.26 X10(3) UL (ref 1.5–7.7)
NEUTROPHILS NFR BLD AUTO: 61.3 %
OSMOLALITY SERPL CALC.SUM OF ELEC: 301 MOSM/KG (ref 275–295)
PHOSPHATE SERPL-MCNC: 3.1 MG/DL (ref 2.5–4.9)
PLATELET # BLD AUTO: 209 10(3)UL (ref 150–450)
POTASSIUM SERPL-SCNC: 3.5 MMOL/L (ref 3.5–5.1)
RBC # BLD AUTO: 3.47 X10(6)UL (ref 3.8–5.8)
SODIUM SERPL-SCNC: 140 MMOL/L (ref 136–145)
WBC # BLD AUTO: 10.2 X10(3) UL (ref 4–11)

## 2019-03-06 PROCEDURE — 93970 EXTREMITY STUDY: CPT | Performed by: CLINICAL NURSE SPECIALIST

## 2019-03-06 PROCEDURE — 99233 SBSQ HOSP IP/OBS HIGH 50: CPT | Performed by: INTERNAL MEDICINE

## 2019-03-06 PROCEDURE — 93880 EXTRACRANIAL BILAT STUDY: CPT | Performed by: CLINICAL NURSE SPECIALIST

## 2019-03-06 PROCEDURE — 70450 CT HEAD/BRAIN W/O DYE: CPT | Performed by: CLINICAL NURSE SPECIALIST

## 2019-03-06 PROCEDURE — 4A023N7 MEASUREMENT OF CARDIAC SAMPLING AND PRESSURE, LEFT HEART, PERCUTANEOUS APPROACH: ICD-10-PCS | Performed by: INTERNAL MEDICINE

## 2019-03-06 PROCEDURE — 99233 SBSQ HOSP IP/OBS HIGH 50: CPT | Performed by: HOSPITALIST

## 2019-03-06 PROCEDURE — B2111ZZ FLUOROSCOPY OF MULTIPLE CORONARY ARTERIES USING LOW OSMOLAR CONTRAST: ICD-10-PCS | Performed by: INTERNAL MEDICINE

## 2019-03-06 RX ORDER — SODIUM CHLORIDE 9 MG/ML
INJECTION, SOLUTION INTRAVENOUS CONTINUOUS
Status: ACTIVE | OUTPATIENT
Start: 2019-03-06 | End: 2019-03-06

## 2019-03-06 RX ORDER — LIDOCAINE HYDROCHLORIDE 20 MG/ML
INJECTION, SOLUTION EPIDURAL; INFILTRATION; INTRACAUDAL; PERINEURAL
Status: COMPLETED
Start: 2019-03-06 | End: 2019-03-06

## 2019-03-06 RX ORDER — POTASSIUM CHLORIDE 20 MEQ/1
20 TABLET, EXTENDED RELEASE ORAL ONCE
Status: COMPLETED | OUTPATIENT
Start: 2019-03-06 | End: 2019-03-06

## 2019-03-06 RX ORDER — MIDAZOLAM HYDROCHLORIDE 1 MG/ML
INJECTION INTRAMUSCULAR; INTRAVENOUS
Status: DISCONTINUED
Start: 2019-03-06 | End: 2019-03-06 | Stop reason: WASHOUT

## 2019-03-06 RX ORDER — MIDAZOLAM HYDROCHLORIDE 1 MG/ML
INJECTION INTRAMUSCULAR; INTRAVENOUS
Status: COMPLETED
Start: 2019-03-06 | End: 2019-03-06

## 2019-03-06 RX ORDER — ASPIRIN 81 MG/1
81 TABLET ORAL DAILY
Status: DISCONTINUED | OUTPATIENT
Start: 2019-03-07 | End: 2019-03-07

## 2019-03-06 NOTE — PROGRESS NOTES
[facility] [facility]  Nephrology Daily Progress Note    Serge Spain  [id number]  78year old      HPI:   Serge Spain is a 78year old male. Just back from cath lab. Tolerated procedure well. Comfortable lying supine. No CP or SOB.        ROS: appropriate for age    Labs:  Lab Results   Component Value Date    WBC 10.2 03/06/2019    HGB 10.4 03/06/2019    HCT 32.7 03/06/2019    .0 03/06/2019    CREATSERUM 1.95 03/06/2019    BUN 39 03/06/2019     03/06/2019    K 3.5 03/06/2019    CL cardiomegaly and mild pulmonary congestion improved somewhat since previous study. There is persistent bibasilar airspace disease which may suggest mild bibasilar pneumonia, atelectasis, or pulmonary congestive changes. Continued follow-up is advised.   B HYDROcodone-acetaminophen (NORCO) 5-325 MG per tab 2 tablet, 2 tablet, Oral, Q4H PRN  •  aspirin EC EC tab 325 mg, 325 mg, Oral, Daily  •  heparin (PORCINE) drip 05018sgsfx/250mL infusion CONTINUOUS, 200-3,000 Units/hr, Intravenous, Continuous  •  ipratrop

## 2019-03-06 NOTE — OCCUPATIONAL THERAPY NOTE
Chart reviewed and attempted OT evaluation this am and pm. Patient was off of floor for cardiac cath, and now for head CT and US vein map of LE. Will follow up for OT evaluation 3/7, RN aware.

## 2019-03-06 NOTE — PROGRESS NOTES
[place] [facility] - [facility]    Progress Note    Nate Castle Patient Status:  Inpatient    1940 MRN [id number]   Location [facility] 2W/SW Attending Shawn Mace MD   [facility] Day # 3 PCP No primary care provider on file.        Subjective: 324 mg Oral Once   • atorvastatin  20 mg Oral Nightly   • Metoprolol Tartrate  50 mg Oral 2x Daily(Beta Blocker)   • isosorbide dinitrate  40 mg Oral TID   • Insulin Aspart Pen  1-7 Units Subcutaneous TID CC   • levofloxacin  750 mg Oral Q48H   • docusate 3/5/2019  CONCLUSION:  1. Cardiomegaly with persistent mild pulmonary vascular congestion and pulmonary interstitial edema. 2. Small left greater than right pleural effusions with associated compressive atelectasis and/or superimposed pneumonia.     Dictat of possible cath Wed.   - Strict I/O, daily weights  - ASA 325mg daily , Plavix 75mg daily   - Nitro, metoprolol  - Cardiology following.   - 2D ECHO     CKD   - Prob at baseline BUN/Creat  - hold nephrotoxins and diuretics.    - rnal US normal. Plans for f

## 2019-03-06 NOTE — PLAN OF CARE
Problem: Patient Centered Care  Goal: Patient preferences are identified and integrated in the patient's plan of care  Interventions:  - What would you like us to know as we care for you? I am visiting from [place] and am Occitan speaking only.    - Provide t effectiveness of vasoactive medications to optimize hemodynamic stability  - Monitor arterial and/or venous puncture sites for bleeding and/or hematoma  - Assess quality of pulses, skin color and temperature  - Assess for signs of decreased coronary artery ELECTROLYTES - ADULT  Goal: Glucose maintained within prescribed range  INTERVENTIONS:  - Monitor Blood Glucose as ordered  - Assess for signs and symptoms of hyperglycemia and hypoglycemia  - Administer ordered medications to maintain glucose within targe

## 2019-03-06 NOTE — PROGRESS NOTES
Cardiology Cardiac Cath Note        3/6/2019   10:44 AM          Procedure Performed: LHC and COR          Indication: NQMI          Cardiologist: Jemma Mg.  Blaise Palacios MD, [facility]          Conscious Sedation Given:  yes     see conscious sedation log          C

## 2019-03-06 NOTE — PROCEDURES
[facility]    PATIENT'S NAME: Linus Ninfa   ATTENDING PHYSICIAN: Gilberto Luis MD   OPERATING PHYSICIAN: Marcelino Lamas.  Soumya Delgado MD   PATIENT ACCOUNT#:   [id number]    LOCATION:  [place] [address]  MEDICAL RECORD #:   [id number]       DA primary approach is recommended. 3.   No assessment of contractility. 4.   An Angio-Seal device was utilized on this occasion. Dictated By Darron Singer MD  d: 03/06/2019 10:57:03  t: 03/06/2019 11:10:21  [facility] [id number]  [facility]/

## 2019-03-06 NOTE — PHYSICAL THERAPY NOTE
Pt was to be seen for PT eval. Consulted w/ RN. Attempted 2x to see pt today. In AM, pt was off the floor for cardiac cath. IN PM, pt was off the floor for CT of head and for vein mapping. Will re-attempt tomorrow when appropriate to see pt.

## 2019-03-06 NOTE — PROGRESS NOTES
Inpatient Throughput Communication:    Called inpatient RN Adenike Villegas and notified of scheduled procedure today. Verified that appropriate consent is signed: Yes  Appropriate Consent Signed:  Yes  Access Site Hair Clipped and skin prepped: Yes  Patient has fu

## 2019-03-06 NOTE — CONSULTS
[facility]    Report of Consultation    Mitch Chavez Patient Status:  Inpatient    1940 MRN [id number]   Location [facility] 2W/SW Attending Nasir Kim MD   Hosp Day # 3 PCP No primary care provider on file.      Date History  Social History    Tobacco Use      Smoking status: Never Smoker      Smokeless tobacco: Never Used    Alcohol use: No      Frequency: Never    Drug use: Not on file          Current Medications:    Current Facility-Administered Medications:  0.9% 3 mL Nebulization Q6H WA   benzonatate (TESSALON) cap 100 mg 100 mg Oral TID PRN   guaiFENesin-codeine (ROBITUSSIN AC) 100-10 MG/5ML syrup 5 mL 5 mL Oral Q4H PRN   Fluticasone Propionate (FLONASE) 50 MCG/ACT nasal spray 1 spray 1 spray Each Nare Daily   in Exam:   Blood pressure 125/68, pulse 52, temperature 97.7 °F (36.5 °C), temperature source Temporal, resp. rate 15, height 5' 6\" (1.676 m), weight 195 lb 5.2 oz (88.6 kg), SpO2 96 %. Intake/Output:   Last 3 shifts: I/O last 3 completed shifts: In: 935. 3 legs.  Pulses are equal and intact in the upper extremities and absent below the femoral area in the bilateral lower extremities.   Skin is warm and dry without obvious pathological lesions ulcerations or wounds  Neurologic exam is grossly normal with motor at 17:03               Impression:     Non-STEMI (non-ST elevated myocardial infarction) ([facility] [address]        Azotemia        Hyperglycemia        Acute on chronic congestive heart failure, unspecified heart failure type (HCC)        CHF (congestive heart failure) for allowing me to participate in the care of your patient. DAVID MCKEE  3/6/2019        *This note was dictated, in part, using a computerized recognition system and may contain unintended errors.

## 2019-03-06 NOTE — PLAN OF CARE
CARDIOVASCULAR - ADULT    • Maintains optimal cardiac output and hemodynamic stability Not Progressing          Diabetes/Glucose Control    • Glucose maintained within prescribed range Progressing        GENITOURINARY - ADULT    • Absence of urinary retent

## 2019-03-06 NOTE — PLAN OF CARE
Patient is now started on scheduled insulin along with a s/s. Will continue to monitor BG for better control  Patient is denying any chest pain and has had good rate and rhythm control. Patient remains with morejon in place good output.  Patient requesting fo

## 2019-03-07 ENCOUNTER — APPOINTMENT (OUTPATIENT)
Dept: GENERAL RADIOLOGY | Facility: HOSPITAL | Age: 79
DRG: 280 | End: 2019-03-07
Attending: HOSPITALIST
Payer: MEDICARE

## 2019-03-07 LAB
ALBUMIN SERPL-MCNC: 2.6 G/DL (ref 3.4–5)
ANION GAP SERPL CALC-SCNC: 7 MMOL/L (ref 0–18)
APTT PPP: 105 SECONDS (ref 23.2–35.3)
BASOPHILS # BLD AUTO: 0.04 X10(3) UL (ref 0–0.2)
BASOPHILS NFR BLD AUTO: 0.4 %
BUN BLD-MCNC: 32 MG/DL (ref 7–18)
BUN/CREAT SERPL: 18.1 (ref 10–20)
CALCIUM BLD-MCNC: 9 MG/DL (ref 8.5–10.1)
CHLORIDE SERPL-SCNC: 111 MMOL/L (ref 98–107)
CO2 SERPL-SCNC: 23 MMOL/L (ref 21–32)
CREAT BLD-MCNC: 1.77 MG/DL (ref 0.7–1.3)
DEPRECATED RDW RBC AUTO: 47.6 FL (ref 35.1–46.3)
EOSINOPHIL # BLD AUTO: 0.44 X10(3) UL (ref 0–0.7)
EOSINOPHIL NFR BLD AUTO: 4.6 %
ERYTHROCYTE [DISTWIDTH] IN BLOOD BY AUTOMATED COUNT: 14.1 % (ref 11–15)
GLUCOSE BLD-MCNC: 143 MG/DL (ref 70–99)
GLUCOSE BLDC GLUCOMTR-MCNC: 100 MG/DL (ref 70–99)
GLUCOSE BLDC GLUCOMTR-MCNC: 156 MG/DL (ref 70–99)
GLUCOSE BLDC GLUCOMTR-MCNC: 182 MG/DL (ref 70–99)
GLUCOSE BLDC GLUCOMTR-MCNC: 200 MG/DL (ref 70–99)
HAV IGM SER QL: 2.1 MG/DL (ref 1.6–2.6)
HCT VFR BLD AUTO: 33.5 % (ref 39–53)
HGB BLD-MCNC: 10.6 G/DL (ref 13–17.5)
IMM GRANULOCYTES # BLD AUTO: 0.03 X10(3) UL (ref 0–1)
IMM GRANULOCYTES NFR BLD: 0.3 %
LYMPHOCYTES # BLD AUTO: 1.47 X10(3) UL (ref 1–4)
LYMPHOCYTES NFR BLD AUTO: 15.5 %
MCH RBC QN AUTO: 29.6 PG (ref 26–34)
MCHC RBC AUTO-ENTMCNC: 31.6 G/DL (ref 31–37)
MCV RBC AUTO: 93.6 FL (ref 80–100)
MONOCYTES # BLD AUTO: 1.07 X10(3) UL (ref 0.1–1)
MONOCYTES NFR BLD AUTO: 11.3 %
NEUTROPHILS # BLD AUTO: 6.42 X10 (3) UL (ref 1.5–7.7)
NEUTROPHILS # BLD AUTO: 6.42 X10(3) UL (ref 1.5–7.7)
NEUTROPHILS NFR BLD AUTO: 67.9 %
OSMOLALITY SERPL CALC.SUM OF ELEC: 301 MOSM/KG (ref 275–295)
PHOSPHATE SERPL-MCNC: 2.5 MG/DL (ref 2.5–4.9)
PLATELET # BLD AUTO: 214 10(3)UL (ref 150–450)
POTASSIUM SERPL-SCNC: 4.3 MMOL/L (ref 3.5–5.1)
RBC # BLD AUTO: 3.58 X10(6)UL (ref 3.8–5.8)
SODIUM SERPL-SCNC: 141 MMOL/L (ref 136–145)
VIT B12 SERPL-MCNC: 716 PG/ML (ref 193–986)
WBC # BLD AUTO: 9.5 X10(3) UL (ref 4–11)

## 2019-03-07 PROCEDURE — 99233 SBSQ HOSP IP/OBS HIGH 50: CPT | Performed by: INTERNAL MEDICINE

## 2019-03-07 PROCEDURE — 99233 SBSQ HOSP IP/OBS HIGH 50: CPT | Performed by: HOSPITALIST

## 2019-03-07 PROCEDURE — 71046 X-RAY EXAM CHEST 2 VIEWS: CPT | Performed by: HOSPITALIST

## 2019-03-07 PROCEDURE — 99223 1ST HOSP IP/OBS HIGH 75: CPT | Performed by: OTHER

## 2019-03-07 RX ORDER — IPRATROPIUM BROMIDE AND ALBUTEROL SULFATE 2.5; .5 MG/3ML; MG/3ML
3 SOLUTION RESPIRATORY (INHALATION)
Status: DISCONTINUED | OUTPATIENT
Start: 2019-03-07 | End: 2019-03-09

## 2019-03-07 RX ORDER — ASPIRIN 325 MG
325 TABLET ORAL DAILY
Status: DISCONTINUED | OUTPATIENT
Start: 2019-03-08 | End: 2019-03-07

## 2019-03-07 RX ORDER — 0.9 % SODIUM CHLORIDE 0.9 %
3 VIAL (ML) INJECTION EVERY 8 HOURS
Status: DISCONTINUED | OUTPATIENT
Start: 2019-03-07 | End: 2019-03-09

## 2019-03-07 RX ORDER — 0.9 % SODIUM CHLORIDE 0.9 %
VIAL (ML) INJECTION
Status: DISPENSED
Start: 2019-03-07 | End: 2019-03-07

## 2019-03-07 RX ORDER — ASPIRIN 81 MG/1
81 TABLET, CHEWABLE ORAL DAILY
Status: DISCONTINUED | OUTPATIENT
Start: 2019-03-07 | End: 2019-03-09

## 2019-03-07 NOTE — PROGRESS NOTES
[facility]    Progress Note    Rolo Briones Patient Status:  Inpatient    1940 MRN [id number]   Location [facility] 2W/SW Attending Justina Jeff MD   [facility] Day # 4 PCP No primary care provider on file.        Subjective: CC   • aspirin  324 mg Oral Once   • atorvastatin  20 mg Oral Nightly   • Metoprolol Tartrate  50 mg Oral 2x Daily(Beta Blocker)   • isosorbide dinitrate  40 mg Oral TID   • Insulin Aspart Pen  1-7 Units Subcutaneous TID CC   • levofloxacin  750 mg Oral Q4 3. Changes of chronic small vessel disease in cerebral white matter. 4. 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 5. Left maxillary sinusitis.     Dictated by (CST): Krista Bartlett MD on 3/06/2019 at 15:41     Approved Assessment and Plan:       Dyspnea  - Multifactorial due to NSTEMI/CHF/Rhinovirus.   - See below   - Resp panel + rhinovirus/enterovirus. - supplemental oxygen as needed - Weaned to room air  - nebs, antitussive.   - IS/Pulm toilet.    - Cards f have not brought his home insulin rgimen yet. - Insulin S/S   - Accu checks.      Hyperlipidemia  - check lipid panel . LDL 54 at goal of < 70   - cont statin       Obesity stage I   - BMI 32  - counseled on diet exercise and weight loss.      Dementia /

## 2019-03-07 NOTE — PHYSICAL THERAPY NOTE
PHYSICAL THERAPY EVALUATION - INPATIENT     Room Number: 227/227-A  Evaluation Date: 3/7/2019  Type of Evaluation: Initial   Physician Order: PT Eval and Treat    Presenting Problem: NSTEMI  Reason for Therapy: Mobility Dysfunction and Discharge Planning THERAPY MEDICAL/SOCIAL HISTORY     History related to current admission: Patient with hx of DM, HLD, cholecystectomy, CHF     Problem List  Principal Problem:    Non-STEMI (non-ST elevated myocardial infarction) ([facility] [address]  Active Problems:    Azotemia    Hyper AM-PAC '6-Clicks' INPATIENT SHORT FORM - BASIC MOBILITY  How much difficulty does the patient currently have. ..  -   Turning over in bed (including adjusting bedclothes, sheets and blankets)?: None   -   Sitting down on and standing up from a chair w

## 2019-03-07 NOTE — CONSULTS
Neurology Inpatient Consult Note    Donato Nava : 1940   Referring Physician: Dr. Ary Lopez  HPI:     Donato Nava is a 78year old male who is being seen in neurologic evaluation. Pt initially presented to hospital 3/3. Notes reviewed. intact V1-3, face symmetric, hearing intact, no dysarthria or dysphonia  Motor: 5/5 strength proximally and distally; normal bulk and tone; no drift  Sensory: intact to light touch throughout  Reflexes: DTRs 2+ in bilateral biceps, brachioradialis, patella

## 2019-03-07 NOTE — PROGRESS NOTES
Transferred pt to tele rm 305 per w/c, report called earlier to KAILEY Chinchilla, no change since. Pt's wife & transporter w/pt. Skin check done w/Ian BA RN, cath site  R groin soft, dressing CDI.

## 2019-03-07 NOTE — PROGRESS NOTES
[facility]    Cardiology Progress Note    Clark Carbajal Patient Status:  Inpatient    1940 MRN [id number]   Location [facility] 2W/SW Attending Kamryn Brown MD   Hosp Day # 4 PCP No primary care provider on file.      3/3 9.0   MG   --   1.8  1.9  2.0  2.1   PHOS   --    --   3.0  3.1  2.5   GLU  128*  292*  237*  119*  143*       Recent Labs   Lab  03/05/19   0408  03/06/19   0415  03/07/19   0659   ALT  16   --    --    AST  52*   --    --    ALB  2.8*  2.7*  2.6*       R heparin (PORCINE) drip 57467ultms/250mL infusion CONTINUOUS 200-3,000 Units/hr Intravenous Continuous   benzonatate (TESSALON) cap 100 mg 100 mg Oral TID PRN   guaiFENesin-codeine (ROBITUSSIN AC) 100-10 MG/5ML syrup 5 mL 5 mL Oral Q4H PRN   Fluticasone P

## 2019-03-07 NOTE — OCCUPATIONAL THERAPY NOTE
OCCUPATIONAL THERAPY EVALUATION - INPATIENT     Room Number: 227/227-A  Evaluation Date: 3/7/2019  Type of Evaluation: Initial  Presenting Problem: (cough, URI)    Physician Order: IP Consult to Occupational Therapy  Reason for Therapy: ADL/IADL Dysfunct [facility])      Past Medical History  Past Medical History:   Diagnosis Date   • Congestive heart disease ([facility] [address]    • Diabetes ([facility] [address]    • Essential hypertension    • Hyperlipidemia        Past Surgical History  Past Surgical History:   Procedure Laterality Date good  Static Standing: fair  Dynamic Standing: fair    FUNCTIONAL ADL ASSESSMENT  Grooming: indep  Feeding: indep  Bathing: CGA  Toileting: indep  Upper Extremity Dressing: indep  Lower Extremity Dressing: indep    Education Provided: Educated patient in r

## 2019-03-08 LAB
ALBUMIN SERPL-MCNC: 2.8 G/DL (ref 3.4–5)
ANION GAP SERPL CALC-SCNC: 8 MMOL/L (ref 0–18)
BASOPHILS # BLD AUTO: 0.04 X10(3) UL (ref 0–0.2)
BASOPHILS NFR BLD AUTO: 0.5 %
BUN BLD-MCNC: 28 MG/DL (ref 7–18)
BUN/CREAT SERPL: 15.3 (ref 10–20)
CALCIUM BLD-MCNC: 9.4 MG/DL (ref 8.5–10.1)
CHLORIDE SERPL-SCNC: 113 MMOL/L (ref 98–107)
CO2 SERPL-SCNC: 22 MMOL/L (ref 21–32)
CREAT BLD-MCNC: 1.83 MG/DL (ref 0.7–1.3)
DEPRECATED RDW RBC AUTO: 47.8 FL (ref 35.1–46.3)
EOSINOPHIL # BLD AUTO: 0.48 X10(3) UL (ref 0–0.7)
EOSINOPHIL NFR BLD AUTO: 5.9 %
ERYTHROCYTE [DISTWIDTH] IN BLOOD BY AUTOMATED COUNT: 14.1 % (ref 11–15)
GLUCOSE BLD-MCNC: 124 MG/DL (ref 70–99)
GLUCOSE BLDC GLUCOMTR-MCNC: 105 MG/DL (ref 70–99)
GLUCOSE BLDC GLUCOMTR-MCNC: 123 MG/DL (ref 70–99)
GLUCOSE BLDC GLUCOMTR-MCNC: 129 MG/DL (ref 70–99)
GLUCOSE BLDC GLUCOMTR-MCNC: 257 MG/DL (ref 70–99)
HAV IGM SER QL: 2.2 MG/DL (ref 1.6–2.6)
HCT VFR BLD AUTO: 34.3 % (ref 39–53)
HGB BLD-MCNC: 10.5 G/DL (ref 13–17.5)
IMM GRANULOCYTES # BLD AUTO: 0.03 X10(3) UL (ref 0–1)
IMM GRANULOCYTES NFR BLD: 0.4 %
LYMPHOCYTES # BLD AUTO: 1.68 X10(3) UL (ref 1–4)
LYMPHOCYTES NFR BLD AUTO: 20.8 %
MCH RBC QN AUTO: 28.9 PG (ref 26–34)
MCHC RBC AUTO-ENTMCNC: 30.6 G/DL (ref 31–37)
MCV RBC AUTO: 94.5 FL (ref 80–100)
MONOCYTES # BLD AUTO: 0.97 X10(3) UL (ref 0.1–1)
MONOCYTES NFR BLD AUTO: 12 %
NEUTROPHILS # BLD AUTO: 4.87 X10 (3) UL (ref 1.5–7.7)
NEUTROPHILS # BLD AUTO: 4.87 X10(3) UL (ref 1.5–7.7)
NEUTROPHILS NFR BLD AUTO: 60.4 %
OSMOLALITY SERPL CALC.SUM OF ELEC: 303 MOSM/KG (ref 275–295)
PHOSPHATE SERPL-MCNC: 2.4 MG/DL (ref 2.5–4.9)
PLATELET # BLD AUTO: 227 10(3)UL (ref 150–450)
POTASSIUM SERPL-SCNC: 4.2 MMOL/L (ref 3.5–5.1)
RBC # BLD AUTO: 3.63 X10(6)UL (ref 3.8–5.8)
SODIUM SERPL-SCNC: 143 MMOL/L (ref 136–145)
WBC # BLD AUTO: 8.1 X10(3) UL (ref 4–11)

## 2019-03-08 PROCEDURE — 99233 SBSQ HOSP IP/OBS HIGH 50: CPT | Performed by: HOSPITALIST

## 2019-03-08 PROCEDURE — 99232 SBSQ HOSP IP/OBS MODERATE 35: CPT | Performed by: INTERNAL MEDICINE

## 2019-03-08 PROCEDURE — 99222 1ST HOSP IP/OBS MODERATE 55: CPT | Performed by: INTERNAL MEDICINE

## 2019-03-08 RX ORDER — CLOPIDOGREL BISULFATE 75 MG/1
75 TABLET ORAL DAILY
Status: DISCONTINUED | OUTPATIENT
Start: 2019-03-08 | End: 2019-03-09

## 2019-03-08 NOTE — PROGRESS NOTES
[facility]    Progress Note    Equilla Aus Patient Status:  Inpatient    1940 MRN [id number]   Location [facility] 2W/SW Attending Marizol Larios MD   Hosp Day # 5 PCP No primary care provider on file.        Subjective: CC   • aspirin  324 mg Oral Once   • atorvastatin  20 mg Oral Nightly   • Metoprolol Tartrate  50 mg Oral 2x Daily(Beta Blocker)   • isosorbide dinitrate  40 mg Oral TID   • Insulin Aspart Pen  1-7 Units Subcutaneous TID CC   • levofloxacin  750 mg Oral Q4 CHF showing little change from March 5, 2019. Dictated by (CST): Asiya Peters MD on 3/07/2019 at 11:43     Approved by (CST):  Asiya Peters MD on 3/07/2019 at 11:46          Ct Brain Or Head ([id number])    Result Date: 3/6/2019  CONCLUSION:  1. Rec d/c jean-pierre in preparation for discharge     Acute exacerbation combined systolic and diastolic CHF, NOS   - Has h/o CHF per family. They did not bring his medications have requested this.    - Strict I/O, daily weights  - ASA daily  - Plavix   - Nitro, metop

## 2019-03-08 NOTE — PROGRESS NOTES
[facility]    Progress Note    Nate Castle Patient Status:  Inpatient    1940 MRN [id number]   Location [facility] 3W/SW Attending Ledy Conteh MD   Hosp Day # 4 PCP No primary care provider on file.        Subjective: present, no abnormal bruising noted  Back/Spine: no abnormalities noted  Musculoskeletal: full ROM all extremities good strength  no deformities  Extremities: no edema, cyanosis  Neurological:  Grossly normal    Results:     Laboratory Data:  Lab Results arteries    Dictated by (CST): Rosa Maria Longoria MD on 3/06/2019 at 16:21     Approved by (CST): Rosa Maria Longoria MD on 3/06/2019 at 16:26           Vein Map Lower Extremity Bilat (cpt=93970)    Result Date: 3/6/2019  CONCLUSION:   FEMORAL/POPLITEAL

## 2019-03-08 NOTE — DIETARY NOTE
NUTRITION NOTE-EDUCATION    Consult received for diet education per protocol. Education deferred until s/p intervention and when appropriate for teaching.     Zahraa Lewis RD,LDN  [phone]

## 2019-03-08 NOTE — PROGRESS NOTES
After discussing the patient with Dr. Gilberto Landry and Dr. Gillian Magallon we had a meeting with patient wife and another family member with Dr. Gilberto Landry kindly translating in  [employer].   I reviewed with them the multiple comorbidities that make patient extremely high risk

## 2019-03-08 NOTE — CONSULTS
[facility]    Report of Consultation    Manuela Bolanos Patient Status:  Inpatient    1940 MRN [id number]   Location [facility] 3W/SW Attending Frederick Oates MD   Hosp Day # 5 PCP No primary care provider on file.      Ministerio family history.     Social History  Social History    Tobacco Use      Smoking status: Never Smoker      Smokeless tobacco: Never Used    Alcohol use: No      Frequency: Never    Drug use: Not on file         Current Medications:    Current Facility-Adminis MG/5ML syrup 5 mL 5 mL Oral Q4H PRN   Fluticasone Propionate (FLONASE) 50 MCG/ACT nasal spray 1 spray 1 spray Each Nare Daily   influenza virus vaccine (FLUAD) ages 72 years and older inj 0.5ml 0.5 mL Intramuscular Prior to discharge   morphINE sulfate (PF 03/04/2019    PTP 15.9 (H) 03/04/2019    T4F 1.0 03/04/2019    TSH 6.510 (H) 03/04/2019    MG 2.2 03/08/2019    PHOS 2.4 (L) 03/08/2019    TROP 1.710 (HH) 03/03/2019    B12 716 03/07/2019         Imaging:  Xr Chest Pa + Lat Chest (dvv=00301)    Result Date ANKLE: 0.34 cm   SMALL SAPHENOUS VEIN-measures approximately 7 cm in length NEAR POPLITEAL: 0.27 cm. PROXIMAL CALF: 0.45. MID CALF: Not visualized.    LEFT LOWER EXTREMITY VEIN MAPPING  GREAT SAPHENOUS VEIN-measures 70 cm from groin to ankle NEAR CFV: 0.7 significant high risk for complications as Dr. Mainor Ramirez listed, includes worsening kidney function and high possibility of antibody dialysis, CVA, respiratory failure, pneumonia, and even death up to 10%  And also these  complications now even higher in the

## 2019-03-08 NOTE — PROGRESS NOTES
[facility]  Nephrology Daily Progress Note    Dane Stewart  [id number]  78year old      HPI:   Dane Stewart is a 78year old male. Quiet night. Seen with Dr. Radha Espinosa who also acted as . Feels well. No CP or SOB.        ROS: appropriate for age    Labs:  Lab Results   Component Value Date    WBC 8.1 03/08/2019    HGB 10.5 03/08/2019    HCT 34.3 03/08/2019    .0 03/08/2019    CREATSERUM 1.83 03/08/2019    BUN 28 03/08/2019     03/08/2019    K 4.2 03/08/2019    CL 1 Us Carotid Doppler Bilat - Diag Img (cpt=93880)    Result Date: 3/6/2019  CONCLUSION:  1. Bilateral carotid bifurcation/proximal ICA plaque without hemodynamic significance.  2. Antegrade flow within both vertebral arteries    Dictated by (CST): Lisa Pritchett 100 UNIT/ML flextouch 20 Units, 20 Units, Subcutaneous, Nightly  •  Insulin Aspart Pen (NOVOLOG) 100 UNIT/ML flexpen 8 Units, 8 Units, Subcutaneous, TID CC  •  aspirin chewable tab 324 mg, 324 mg, Oral, Once  •  atorvastatin (LIPITOR) tab 20 mg, 20 mg, Ora 5606  Gross per 24 hour   Intake 830 ml   Output 1000 ml   Net -170 ml          ASSESSMENT/PLAN:   Assessment   Patient Active Problem List:     Azotemia     Hyperglycemia     Non-STEMI (non-ST elevated myocardial infarction) ([facility] [address]     Acute on chronic con

## 2019-03-09 VITALS
HEIGHT: 66 IN | DIASTOLIC BLOOD PRESSURE: 61 MMHG | TEMPERATURE: 98 F | SYSTOLIC BLOOD PRESSURE: 125 MMHG | RESPIRATION RATE: 18 BRPM | BODY MASS INDEX: 31.24 KG/M2 | OXYGEN SATURATION: 93 % | WEIGHT: 194.38 LBS | HEART RATE: 61 BPM

## 2019-03-09 LAB
ALBUMIN SERPL-MCNC: 2.6 G/DL (ref 3.4–5)
ALBUMIN/GLOB SERPL: 0.7 {RATIO} (ref 1–2)
ALP LIVER SERPL-CCNC: 71 U/L (ref 45–117)
ALT SERPL-CCNC: 14 U/L (ref 16–61)
ANION GAP SERPL CALC-SCNC: 8 MMOL/L (ref 0–18)
AST SERPL-CCNC: 18 U/L (ref 15–37)
BASOPHILS # BLD AUTO: 0.03 X10(3) UL (ref 0–0.2)
BASOPHILS NFR BLD AUTO: 0.4 %
BILIRUB SERPL-MCNC: 0.3 MG/DL (ref 0.1–2)
BUN BLD-MCNC: 29 MG/DL (ref 7–18)
BUN/CREAT SERPL: 17.4 (ref 10–20)
CALCIUM BLD-MCNC: 8.7 MG/DL (ref 8.5–10.1)
CHLORIDE SERPL-SCNC: 113 MMOL/L (ref 98–107)
CO2 SERPL-SCNC: 22 MMOL/L (ref 21–32)
CREAT BLD-MCNC: 1.67 MG/DL (ref 0.7–1.3)
DEPRECATED RDW RBC AUTO: 47.8 FL (ref 35.1–46.3)
EOSINOPHIL # BLD AUTO: 0.5 X10(3) UL (ref 0–0.7)
EOSINOPHIL NFR BLD AUTO: 6.9 %
ERYTHROCYTE [DISTWIDTH] IN BLOOD BY AUTOMATED COUNT: 14 % (ref 11–15)
GLOBULIN PLAS-MCNC: 3.8 G/DL (ref 2.8–4.4)
GLUCOSE BLD-MCNC: 137 MG/DL (ref 70–99)
GLUCOSE BLDC GLUCOMTR-MCNC: 128 MG/DL (ref 70–99)
HAV IGM SER QL: 2.1 MG/DL (ref 1.6–2.6)
HCT VFR BLD AUTO: 34.5 % (ref 39–53)
HGB BLD-MCNC: 10.9 G/DL (ref 13–17.5)
IMM GRANULOCYTES # BLD AUTO: 0.02 X10(3) UL (ref 0–1)
IMM GRANULOCYTES NFR BLD: 0.3 %
LYMPHOCYTES # BLD AUTO: 1.64 X10(3) UL (ref 1–4)
LYMPHOCYTES NFR BLD AUTO: 22.7 %
M PROTEIN MFR SERPL ELPH: 6.4 G/DL (ref 6.4–8.2)
MCH RBC QN AUTO: 29.6 PG (ref 26–34)
MCHC RBC AUTO-ENTMCNC: 31.6 G/DL (ref 31–37)
MCV RBC AUTO: 93.8 FL (ref 80–100)
MONOCYTES # BLD AUTO: 1 X10(3) UL (ref 0.1–1)
MONOCYTES NFR BLD AUTO: 13.8 %
NEUTROPHILS # BLD AUTO: 4.04 X10 (3) UL (ref 1.5–7.7)
NEUTROPHILS # BLD AUTO: 4.04 X10(3) UL (ref 1.5–7.7)
NEUTROPHILS NFR BLD AUTO: 55.9 %
OSMOLALITY SERPL CALC.SUM OF ELEC: 304 MOSM/KG (ref 275–295)
PHOSPHATE SERPL-MCNC: 3.8 MG/DL (ref 2.5–4.9)
PLATELET # BLD AUTO: 222 10(3)UL (ref 150–450)
POTASSIUM SERPL-SCNC: 4.2 MMOL/L (ref 3.5–5.1)
RBC # BLD AUTO: 3.68 X10(6)UL (ref 3.8–5.8)
SODIUM SERPL-SCNC: 143 MMOL/L (ref 136–145)
WBC # BLD AUTO: 7.2 X10(3) UL (ref 4–11)

## 2019-03-09 PROCEDURE — 99239 HOSP IP/OBS DSCHRG MGMT >30: CPT | Performed by: HOSPITALIST

## 2019-03-09 PROCEDURE — 99232 SBSQ HOSP IP/OBS MODERATE 35: CPT | Performed by: INTERNAL MEDICINE

## 2019-03-09 RX ORDER — FLUTICASONE PROPIONATE 50 MCG
1 SPRAY, SUSPENSION (ML) NASAL
Qty: 1 INHALER | Refills: 0 | Status: SHIPPED | OUTPATIENT
Start: 2019-03-09

## 2019-03-09 RX ORDER — BLOOD-GLUCOSE METER
1 KIT MISCELLANEOUS AS NEEDED
Qty: 1 EACH | Refills: 0 | Status: SHIPPED | OUTPATIENT
Start: 2019-03-09

## 2019-03-09 RX ORDER — FUROSEMIDE 20 MG/1
20 TABLET ORAL DAILY
Qty: 30 TABLET | Refills: 0 | Status: SHIPPED | OUTPATIENT
Start: 2019-03-09

## 2019-03-09 RX ORDER — LEVOFLOXACIN 750 MG/1
750 TABLET ORAL
Qty: 2 TABLET | Refills: 0 | Status: SHIPPED | OUTPATIENT
Start: 2019-03-10 | End: 2019-03-13

## 2019-03-09 NOTE — DISCHARGE SUMMARY
[facility]ITAL[facility]  DISCHARGE SUMMARY     [place] Patient Status:  Inpatient    1940 MRN [id number]   Location [facility] 3W/SW Attending No att. providers found   [address] Day # 6 PCP No primary care provider on file.      Date of Admissi rhinovirus/enterovirus. - supplemental oxygen as needed - Weaned to room air  - nebs, antitussive.   - IS/Pulm toilet.    - Cards following  - DC droplet 7 days from onset of symptoms      NSTEMI  Triple vessel CAD  - EKG changes, CP and elevated troponin weeks)  - Check Vitamin B12, ok  - delirium precautions                Quality:  · DVT Prophylaxis: heparin gtt   · CODE status: Full.    · Dispo:  Home, f/u MD the next week for repeat labs                Discharge Medication List:     Discharge Medication aspirin 81 MG Tabs      Take 81 mg by mouth daily. Refills:  0     atorvastatin 20 MG Tabs  Commonly known as:  LIPITOR      Take 20 mg by mouth nightly.    Refills:  0     Bisoprolol Fumarate 5 MG Tabs  Commonly known as:  ZEBETA      Take 5 mg by mout See electronic chart      Hospital Discharge Diagnoses: NSTEMI    Lace+ Score: 74  59-90 High Risk  29-58 Medium Risk  0-28   Low Risk. TCM Follow-Up Recommendation:  LACE 29-58:  Moderate Risk of readmission after discharge from the hospital.        [facility]

## 2019-03-09 NOTE — PROGRESS NOTES
[facility] [place] [facility]    Progress Note      Assessment and Plan:   #1. Rhinoviral bronchitis–resolving. Agree with discharge planning.     2.  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

## 2019-03-09 NOTE — PLAN OF CARE
Problem: Patient Centered Care  Goal: Patient preferences are identified and integrated in the patient's plan of care  Interventions:  - What would you like us to know as we care for you? I am visiting from [facility] and am Frisian speaking only.    - Provide t effectiveness of vasoactive medications to optimize hemodynamic stability  - Monitor arterial and/or venous puncture sites for bleeding and/or hematoma  - Assess quality of pulses, skin color and temperature  - Assess for signs of decreased coronary artery ELECTROLYTES - ADULT  Goal: Glucose maintained within prescribed range  INTERVENTIONS:  - Monitor Blood Glucose as ordered  - Assess for signs and symptoms of hyperglycemia and hypoglycemia  - Administer ordered medications to maintain glucose within targe comfortably in bed, locked in the lowest position, with call light within reach. Will continue to monitor.

## 2019-03-09 NOTE — PLAN OF CARE
Problem: Patient Centered Care  Goal: Patient preferences are identified and integrated in the patient's plan of care  Interventions:  - What would you like us to know as we care for you? I am visiting from [facility] and am Korean speaking only.    - Provide t cardiac output  - Evaluate effectiveness of vasoactive medications to optimize hemodynamic stability  - Monitor arterial and/or venous puncture sites for bleeding and/or hematoma  - Assess quality of pulses, skin color and temperature  - Assess for signs o METABOLIC/FLUID AND ELECTROLYTES - ADULT  Goal: Glucose maintained within prescribed range  INTERVENTIONS:  - Monitor Blood Glucose as ordered  - Assess for signs and symptoms of hyperglycemia and hypoglycemia  - Administer ordered medications to maintain

## 2019-06-27 NOTE — PROGRESS NOTES
[facility]    Progress Note    Omega Old Patient Status:  Inpatient    1940 MRN [id number]   Location [facility] 3W/SW Attending No att. providers found   [facility] Day # 6 PCP No primary care provider on file.        Subjective present, no abnormal bruising noted  Back/Spine: no abnormalities noted  Musculoskeletal: full ROM all extremities good strength  no deformities  Extremities: no edema, cyanosis  Neurological:  Grossly normal    Results:     Laboratory Data:  Lab Results
